# Patient Record
Sex: MALE | URBAN - METROPOLITAN AREA
[De-identification: names, ages, dates, MRNs, and addresses within clinical notes are randomized per-mention and may not be internally consistent; named-entity substitution may affect disease eponyms.]

---

## 2019-03-22 ENCOUNTER — THERAPY VISIT (OUTPATIENT)
Dept: CHIROPRACTIC MEDICINE | Facility: CLINIC | Age: 54
End: 2019-03-22
Payer: COMMERCIAL

## 2019-03-22 DIAGNOSIS — M99.05 SOMATIC DYSFUNCTION OF PELVIS REGION: Primary | ICD-10-CM

## 2019-03-22 DIAGNOSIS — M79.10 MYALGIA: ICD-10-CM

## 2019-03-22 DIAGNOSIS — M76.899 HAMSTRING TENDINITIS AT ORIGIN: ICD-10-CM

## 2019-03-22 DIAGNOSIS — M25.551 HIP PAIN, RIGHT: ICD-10-CM

## 2019-03-22 PROCEDURE — 99202 OFFICE O/P NEW SF 15 MIN: CPT | Performed by: CHIROPRACTOR

## 2019-03-22 NOTE — PROGRESS NOTES
[facility]  Mar 22, 2019    Reaching out to you in hopes you may have some available time for me to come in for an appointment and get your assessment.  Watson Ku, a colleague from my work, referred you.    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  In mid-December I developed issues with my right hip/upper hamstring.  I stopped running in early February, and dialed back biking efforts but still rode occasionally.  I have seen no improvement, and if anything, my issue has worsened to now include the left piriformis and some knee pain too as well just to complete the picture.    Looking for some help, and Watson suggested I email you first, and then go from there.  Thanks in advance,    Running on treadmill was new event that trigged it  Started right posterior hamstring  Started to clams and RDL, squats  Stopped running late January and biking OK  Then stopped everything, pain in right knee  R 28/40        Subjective:  Jose Ahn   53 year old   male    CC: Right posterior hip and hamstring pain  Medications reviewed: Denies  Visit: 1/6  Goal: sit for 30 minutes without pain, run more than 30 minutes without pain   Location:  R posterior hip, R proximal hamstring  MISHA: no specific MISHA, increased mileage on treadmill  Onset: December 2018  Pain: varies but 4/10 on average  Previous History: denies any significant injuries in time which required time off  Progression: not changing   Quality: ache, tightness  Radiation: denies at any time  Pain is worse with: sitting for long periods, running  Pain is better with: rest, biking  Timing: frequent pain   Under care: has not seen anyone else for this  Imaging: denies  Social: alert, oriented, and active. Enjoys running and biking.    Objective:  Inspection:  No SDD  No Scars  Normal Gait    Palpation:  No specific pain  Palpable soreness over R posterior hip, R  proximal hamsting  Myofascitis 2/4 noted over R hamstring, R hip ER's    ROM:  Lumbar flexion   90/90, tightness right hamstring   Lumbar extension  30/30  Right Hip IR  20/45  Left Hip IR  34/45  Right Hip ER  40/60  Left  hip ER  46/60  SLR soreness over right proximal hamstring     MMT:  Left glute med 5/5 with no pain  Right glute med 4/5 with no pain  Left TFL 5/5 with no pain  Right TFL 5/5 with no pain  Left piriformis 5/5 with no pain  Right piriformis 4/5 with posterior hip discomfort  Hamstring straight 5/5, with hip 90 4/5 with pain on right     MET:  Right short leg    Squat:  Shift to right  Foot flare to right    Lunge:  Right knee genu valgum  Right knee cross over     NAL:  Restricted SI on right side    Neuro:  Able to toe walk and heel walk  L4-S1 light touch WNL     Ortho:  SLR (-), Kemps mild lower back pain, SI compression (-), nerve tension test (-), Fader (mild anterior hip pain)      Assessment:  NAL with associated myofascitis and weakness  Hip pain, possible KARINA, proximal hamstring tendinopathy    Plan:   Decrease pain 50%    Restore symmetric hip IR   Restore symmetric hip ER   Strengthen hip stabilizers to 5/5 B   Restore pelvic leveling   Restore segmental motion   Functional goals in history    Patient was given detailed history, review of symptoms, examination, functional examination, and report of findings. After this patient was treated with chiropractic adjustments, manual therapy, and therapeutic exercise. Patient tolerated treatment well. Patients treatment plan with be 2 times per week for 2 weeks followed by 1 time per week for 2 weeks. Following treatment plan a follow up exam will be done to make sure patient is improving. Treatment frequency will degrease as patients subjective complaints improve as well as objective findings. Prognosis for care is good based on fact that patient is active and is willing to take active approach in care.     Patient tolerated treatment well  today  Treatment Time: 45 minutes  91641 manipulation 1-2 segments: MET, Hip LAD  12604 Manual therapy: (ART, Graston, Strain Counter Strain, Fascial Manipulation, Cupping) performed over area of R hip ER's, R hamstring, R psoas  89177 therapeutic exercise (30 minutes):  Review of previous and given new below  Strapping: hip IR on R  Next visit: 1 week  Other:    1. Diver nerve tension   [email or web address]?v=[email or web address]  -in this video, you only need to do exercise # 2 called the diver  -follow video recommendations for reps    2. Side Lying Leg Raise  https://www.[employer].com/watch?v=bcHzPBqY53Y&index=7&list=[email or web address]  -No band necessary   -1 set of 30 (can break it up to 2 sets of 15)  -Keep that hip forward like in video    3. Standing Fire Hydrant   https://www.[employer].com/watch?v=jcIVVubCg8p  -Red band  -First motion is slight extension and then perform the fire hydrant  -hold position for 5 seconds  - 10-15 each leg    4. Bottom leg hip external rotation mobility  https://[email or web address].com/watch?v=0O7wwIn_-uY&t=1s  -I prefer off a bed so you can get more range of motion  -no band needed  -1 set of 30 max  -slow motion making sure you are not just flexing hip and knee is coming up    Hope this all makes sense.   Yaniv Carroll DC, MEd, ATC

## 2019-03-24 PROBLEM — M99.05 SOMATIC DYSFUNCTION OF PELVIS REGION: Status: ACTIVE | Noted: 2019-03-24

## 2019-03-24 PROBLEM — M79.10 MYALGIA: Status: ACTIVE | Noted: 2019-03-24

## 2019-03-24 PROBLEM — M76.899 HAMSTRING TENDINITIS AT ORIGIN: Status: ACTIVE | Noted: 2019-03-24

## 2019-03-24 PROBLEM — M25.551 HIP PAIN, RIGHT: Status: ACTIVE | Noted: 2019-03-24

## 2019-03-29 ENCOUNTER — THERAPY VISIT (OUTPATIENT)
Dept: CHIROPRACTIC MEDICINE | Facility: CLINIC | Age: 54
End: 2019-03-29
Payer: COMMERCIAL

## 2019-03-29 DIAGNOSIS — M76.899 HAMSTRING TENDINITIS AT ORIGIN: ICD-10-CM

## 2019-03-29 DIAGNOSIS — M79.10 MYALGIA: ICD-10-CM

## 2019-03-29 DIAGNOSIS — M99.05 SOMATIC DYSFUNCTION OF PELVIS REGION: Primary | ICD-10-CM

## 2019-03-29 DIAGNOSIS — M25.551 HIP PAIN, RIGHT: ICD-10-CM

## 2019-03-29 PROCEDURE — 98940 CHIROPRACT MANJ 1-2 REGIONS: CPT | Mod: AT | Performed by: CHIROPRACTOR

## 2019-03-29 PROCEDURE — 97110 THERAPEUTIC EXERCISES: CPT | Performed by: CHIROPRACTOR

## 2019-03-29 NOTE — PROGRESS NOTES
[facility]  Mar 22, 2019    Reaching out to you in hopes you may have some available time for me to come in for an appointment and get your assessment.  Watson Ku, a colleague from my work, referred you.    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  In mid-December I developed issues with my right hip/upper hamstring.  I stopped running in early February, and dialed back biking efforts but still rode occasionally.  I have seen no improvement, and if anything, my issue has worsened to now include the left piriformis and some knee pain too as well just to complete the picture.    Looking for some help, and Watson suggested I email you first, and then go from there.  Thanks in advance,    Running on treadmill was new event that trigged it  Started right posterior hamstring  Started to clams and RDL, squats  Stopped running late January and biking OK  Then stopped everything, pain in right knee  R 28/40        Subjective:  Jose Ahn   53 year old   male    CC: Right posterior hip and hamstring pain  Medications reviewed: Denies  Visit: 2/6  Goal: sit for 30 minutes without pain, run more than 30 minutes without pain   Location:  R posterior hip, R proximal hamstring  MISHA: no specific MISHA, increased mileage on treadmill  Onset: December 2018  Pain: varies but 4/10 on average  Previous History: denies any significant injuries in time which required time off  Progression: not changing   Quality: ache, tightness  Radiation: denies at any time  Pain is worse with: sitting for long periods, running  Pain is better with: rest, biking  Timing: frequent pain   Under care: has not seen anyone else for this  Imaging: denies  Social: alert, oriented, and active. Enjoys running and biking.  Comes in today doing well. Tolerated exam well. Working on active care. Notes that feels better last couple days. Is getting massage.  Denies any new issues.     Objective:  Inspection:  No SDD  No Scars  Normal Gait    Palpation:  No specific pain  Palpable soreness over R posterior hip, R proximal hamsting  Myofascitis 2/4 noted over R hamstring, R hip ER's    ROM:  Lumbar flexion   90/90, tightness right hamstring   Lumbar extension  30/30  Right Hip IR  20/45  Left Hip IR  34/45  Right Hip ER  40/60  Left  hip ER  46/60  SLR soreness over right proximal hamstring     MMT:  Left glute med 5/5 with no pain  Right glute med 4/5 with no pain  Left TFL 5/5 with no pain  Right TFL 5/5 with no pain  Left piriformis 5/5 with no pain  Right piriformis 4/5 with posterior hip discomfort  Hamstring straight 5/5, with hip 90 4/5 with pain on right     MET:  Right short leg    Squat:  Shift to right  Foot flare to right    Lunge:  Right knee genu valgum  Right knee cross over     NAL:  Restricted SI on right side    Ortho:  SLR (-), Kemps mild lower back pain, SI compression (-), nerve tension test (-), Fader (mild anterior hip pain)      Assessment:  NAL with associated myofascitis and weakness  Hip pain, possible KARINA, proximal hamstring tendinopathy    Plan:    Patient tolerated treatment well today  Treatment Time: 45 minutes  85623 manipulation 1-2 segments: MET, Hip LAD  21982 Manual therapy: (ART, Graston, Strain Counter Strain, Fascial Manipulation, Cupping) performed over area of R hip ER's, R hamstring, R psoas  08718 therapeutic exercise (30 minutes):  Review of previous and given new below  Added: single leg bridges with naida, figure 4 glute activation   Dry Needle: 7 over posterior hip on right (tolerated well)  Strapping: hip IR on R  Next visit: 1 week  Other:    1. Diver nerve tension   https://www.youtube.com/watch?v=ONCSNxmQTzE  -in this video, you only need to do exercise # 2 called the diver  -follow video recommendations for reps    2. Side Lying Leg  Raise  https://www.[employer].com/watch?v=aaMnIZyH54A&index=7&list=[email or web address]  -No band necessary   -1 set of 30 (can break it up to 2 sets of 15)  -Keep that hip forward like in video    3. Standing Fire Hydrant   https://www.[employer].com/watch?v=vrGVGdzOd5c  -Red band  -First motion is slight extension and then perform the fire hydrant  -hold position for 5 seconds  - 10-15 each leg    4. Bottom leg hip external rotation mobility  https://www.[employer].com/watch?v=0O7wwIn_-uY&t=1s  -I prefer off a bed so you can get more range of motion  -no band needed  -1 set of 30 max  -slow motion making sure you are not just flexing hip and knee is coming up    Hope this all makes sense.   Yaniv Carroll DC, MEd, ATC

## 2019-04-05 ENCOUNTER — THERAPY VISIT (OUTPATIENT)
Dept: CHIROPRACTIC MEDICINE | Facility: CLINIC | Age: 54
End: 2019-04-05
Payer: COMMERCIAL

## 2019-04-05 DIAGNOSIS — M25.551 HIP PAIN, RIGHT: ICD-10-CM

## 2019-04-05 DIAGNOSIS — M79.10 MYALGIA: ICD-10-CM

## 2019-04-05 DIAGNOSIS — M99.05 SOMATIC DYSFUNCTION OF PELVIS REGION: Primary | ICD-10-CM

## 2019-04-05 DIAGNOSIS — M76.899 HAMSTRING TENDINITIS AT ORIGIN: ICD-10-CM

## 2019-04-05 PROCEDURE — 97110 THERAPEUTIC EXERCISES: CPT | Performed by: CHIROPRACTOR

## 2019-04-05 PROCEDURE — 98940 CHIROPRACT MANJ 1-2 REGIONS: CPT | Mod: AT | Performed by: CHIROPRACTOR

## 2019-04-05 NOTE — PROGRESS NOTES
[facility]  Mar 22, 2019  Rehab run again this morning, same warmup as Tuesday. To be honest, was really skeptical before starting given how I felt last couple days. The deep piriformis/glute sharp pain flares did subside by Wednesday, but was then experiencing some periodic soreness in knee all day Wednesday (sort of like it would hurt a little in the hip and move down to knee and almost give out after a bit of walking). So no soreness at rest, just periodic if I walked around for a while while walking.    This morning, started out the run with some glute soreness, really thought I wasn't going to make it out of the driveway (so you know our driveway is about a 1/3 of a mile). But, as I went along, it got better as things warmed up. No knee pain at all, no piriformis pain stabbing, just a mild level of discomfort for the whole run--2 out of 10 type thing. Never got better, but also did not get worse. I actually did a slight modification where I did two 3 and 2 repeats, then one 6 minute long run to end.     So I think that I'd have to say it went better today than Tuesday overall.    See you tomorrow    Subjective:  Jose Ahn   53 year old   male    CC: Right posterior hip and hamstring pain  Medications reviewed: Denies  Visit: 3/6  Goal: sit for 30 minutes without pain, run more than 30 minutes without pain   Location:  R posterior hip, R proximal hamstring  MISHA: no specific MISHA, increased mileage on treadmill  Onset: December 2018  Pain: varies but 4/10 on average  Previous History: denies any significant injuries in time which required time off  Progression: not changing   Quality: ache, tightness  Radiation: denies at any time  Pain is worse with: sitting for long periods, running  Pain is better with: rest, biking  Timing: frequent pain   Under care: has not seen anyone else for this  Imaging: denies  Social: alert, oriented, and active. Enjoys running and biking.  Comes in today  doing well. See email above. Has been working on active care program and starting to slowly progress with running. Denies any new issues. Pleased with progress.   Objective:  Inspection:  No SDD  No Scars  Normal Gait    Palpation:  No specific pain  Palpable soreness over R posterior hip, R proximal hamsting  Myofascitis 2/4 noted over R hamstring, R hip ER's    ROM:  Lumbar flexion   90/90, tightness right hamstring   Lumbar extension  30/30  Right Hip IR  20/45  Left Hip IR  34/45  Right Hip ER  40/60  Left  hip ER  46/60  SLR soreness over right proximal hamstring     MMT:  Left glute med 5/5 with no pain  Right glute med 4/5 with no pain  Left TFL 5/5 with no pain  Right TFL 5/5 with no pain  Left piriformis 5/5 with no pain  Right piriformis 4/5 with posterior hip discomfort  Hamstring straight 5/5, with hip 90 4/5 with pain on right     MET:  Right short leg    Squat:  Shift to right  Foot flare to right    Lunge:  Right knee genu valgum  Right knee cross over     NAL:  Restricted SI on right side    Ortho:  SLR (-), Kemps mild lower back pain, SI compression (-), nerve tension test (-), Fader (mild anterior hip pain)      Assessment:  NAL with associated myofascitis and weakness  Hip pain, possible KARINA, proximal hamstring tendinopathy    Plan:    Patient tolerated treatment well today  Treatment Time: 45 minutes  96373 manipulation 1-2 segments: MET, Hip LAD  75858 Manual therapy: (ART, Graston, Strain Counter Strain, Fascial Manipulation, Cupping) performed over area of R hip ER's, R hamstring, R psoas  92934 therapeutic exercise (30 minutes):  Review of previous and given new below  Added: single leg bridges with naida, figure 4 glute activation, thusters, sliders,   Dry Needle: 7 over posterior hip on right (tolerated well)  Strapping: hip IR on R  Next visit: 1 week  Other:    1. Diver nerve tension   https://www.[employer].com/watch?v=ONCSNxmQTzE  -in this video, you only need to do exercise # 2 called  the diver  -follow video recommendations for reps    2. Side Lying Leg Raise  https://www.[employer].com/watch?v=uaVgZDfO77T&index=7&list=[email or web address]  -No band necessary   -1 set of 30 (can break it up to 2 sets of 15)  -Keep that hip forward like in video    3. Standing Fire Hydrant   https://www.[employer].com/watch?v=[email or web address]  -Red band  -First motion is slight extension and then perform the fire hydrant  -hold position for 5 seconds  - 10-15 each leg    4. Bottom leg hip external rotation mobility  https://www.[employer].com/watch?v=0O7wwIn_-uY&t=1s  -I prefer off a bed so you can get more range of motion  -no band needed  -1 set of 30 max  -slow motion making sure you are not just flexing hip and knee is coming up    Hope this all makes sense.   Yaniv Carroll DC, MEd, ATC

## 2019-04-12 ENCOUNTER — THERAPY VISIT (OUTPATIENT)
Dept: CHIROPRACTIC MEDICINE | Facility: CLINIC | Age: 54
End: 2019-04-12
Payer: COMMERCIAL

## 2019-04-12 DIAGNOSIS — M76.899 HAMSTRING TENDINITIS AT ORIGIN: ICD-10-CM

## 2019-04-12 DIAGNOSIS — M99.05 SOMATIC DYSFUNCTION OF PELVIS REGION: Primary | ICD-10-CM

## 2019-04-12 DIAGNOSIS — M25.551 HIP PAIN, RIGHT: ICD-10-CM

## 2019-04-12 DIAGNOSIS — M79.10 MYALGIA: ICD-10-CM

## 2019-04-12 PROCEDURE — 97110 THERAPEUTIC EXERCISES: CPT | Performed by: CHIROPRACTOR

## 2019-04-12 PROCEDURE — 98940 CHIROPRACT MANJ 1-2 REGIONS: CPT | Mod: AT | Performed by: CHIROPRACTOR

## 2019-04-12 NOTE — PROGRESS NOTES
[facility]  Mar 22, 2019  Subjective:  Jose Ahn   53 year old   male    CC: Right posterior hip and hamstring pain  Medications reviewed: Denies  Visit: 3/6  Goal: sit for 30 minutes without pain, run more than 30 minutes without pain   Location:  R posterior hip, R proximal hamstring  MISHA: no specific MISHA, increased mileage on treadmill  Onset: December 2018  Pain: varies but 4/10 on average  Previous History: denies any significant injuries in time which required time off  Progression: not changing   Quality: ache, tightness  Radiation: denies at any time  Pain is worse with: sitting for long periods, running  Pain is better with: rest, biking  Timing: frequent pain   Under care: has not seen anyone else for this  Imaging: denies  Social: alert, oriented, and active. Enjoys running and biking.  Comes in today doing OK. Had mild set back and doesn't know if it is from run / walk or exercise. Talked about seeing MD for imaging and he agreed. We continued treatment and gave new rehab today as well. Denies any new symptoms. Notes pain anterior and posterior hip on right.     Objective:  Inspection:  No SDD  No Scars  Normal Gait    Palpation:  No specific pain  Palpable soreness over R posterior hip, R proximal hamsting  Myofascitis 2/4 noted over R hamstring, R hip ER's    ROM:  Lumbar flexion   90/90, tightness right hamstring   Lumbar extension  30/30  Right Hip IR  20/45  Left Hip IR  34/45  Right Hip ER  40/60  Left  hip ER  46/60  SLR soreness over right proximal hamstring     MMT:  Left glute med 5/5 with no pain  Right glute med 4/5 with no pain  Left TFL 5/5 with no pain  Right TFL 5/5 with no pain  Left piriformis 5/5 with no pain  Right piriformis 4/5 with posterior hip discomfort  Hamstring straight 5/5, with hip 90 4/5 with pain on right     MET:  Right short leg    Squat:  Shift to right  Foot flare to right    Lunge:  Right knee genu valgum  Right knee cross over      NAL:  Restricted SI on right side    Ortho:  SLR (-), Kemps mild lower back pain, SI compression (-), nerve tension test (-), Fader (mild anterior hip pain)      Assessment:  NAL with associated myofascitis and weakness  Hip pain, possible KARINA, proximal hamstring tendinopathy    Plan:    Patient tolerated treatment well today  Treatment Time: 45 minutes  97429 manipulation 1-2 segments: MET, Hip LAD  12883 Manual therapy: (ART, Graston, Strain Counter Strain, Fascial Manipulation, Cupping) performed over area of R hip ER's, R hamstring, R psoas  10345 therapeutic exercise (30 minutes):  Review of previous and given new below  Added: single leg bridges with naida, figure 4 glute activation, thusters, sliders,  Today: single leg bridge with naida, monster walks black band, standing fire hydrant    Dry Needle: 7 over posterior hip on right (tolerated well)  Strapping: hip IR on R  Next visit: will see MD  Other:    1. Diver nerve tension   https://www.[employer].com/watch?v=ONCSNxmQTzE  -in this video, you only need to do exercise # 2 called the diver  -follow video recommendations for reps    2. Side Lying Leg Raise  https://www.[employer].com/watch?v=wtZvQZdI54K&index=7&list=[email or web address]  -No band necessary   -1 set of 30 (can break it up to 2 sets of 15)  -Keep that hip forward like in video    3. Standing Fire Hydrant   https://www.[employer].com/watch?v=pbFHZhfYp1b  -Red band  -First motion is slight extension and then perform the fire hydrant  -hold position for 5 seconds  - 10-15 each leg    4. Bottom leg hip external rotation mobility  https://www.[employer].com/watch?v=0O7wwIn_-uY&t=1s  -I prefer off a bed so you can get more range of motion  -no band needed  -1 set of 30 max  -slow motion making sure you are not just flexing hip and knee is coming up    Hope this all makes sense.   Yaniv Carroll DC, MEd, ATC